# Patient Record
Sex: MALE | Race: WHITE | Employment: UNEMPLOYED | URBAN - METROPOLITAN AREA
[De-identification: names, ages, dates, MRNs, and addresses within clinical notes are randomized per-mention and may not be internally consistent; named-entity substitution may affect disease eponyms.]

---

## 2021-12-14 ENCOUNTER — HOSPITAL ENCOUNTER (OUTPATIENT)
Dept: LAB | Age: 71
Discharge: HOME | End: 2021-12-14
Payer: MEDICARE

## 2021-12-14 ENCOUNTER — TRANSCRIBE ORDER (OUTPATIENT)
Dept: REGISTRATION | Age: 71
End: 2021-12-14

## 2021-12-14 DIAGNOSIS — Z01.812 ENCOUNTER FOR PREOPERATIVE SCREENING LABORATORY TESTING FOR COVID-19 VIRUS: ICD-10-CM

## 2021-12-14 DIAGNOSIS — Z01.812 ENCOUNTER FOR PREOPERATIVE SCREENING LABORATORY TESTING FOR COVID-19 VIRUS: Primary | ICD-10-CM

## 2021-12-14 DIAGNOSIS — Z20.822 ENCOUNTER FOR PREOPERATIVE SCREENING LABORATORY TESTING FOR COVID-19 VIRUS: Primary | ICD-10-CM

## 2021-12-14 DIAGNOSIS — Z20.822 ENCOUNTER FOR PREOPERATIVE SCREENING LABORATORY TESTING FOR COVID-19 VIRUS: ICD-10-CM

## 2021-12-14 PROCEDURE — U0005 INFEC AGEN DETEC AMPLI PROBE: HCPCS

## 2021-12-15 LAB
SARS-COV-2, XPLCVT: NOT DETECTED
SOURCE, COVRS: NORMAL

## 2021-12-17 ENCOUNTER — HOSPITAL ENCOUNTER (OUTPATIENT)
Age: 71
Setting detail: OUTPATIENT SURGERY
Discharge: HOME | End: 2021-12-17
Attending: SPECIALIST | Admitting: SPECIALIST
Payer: MEDICARE

## 2021-12-17 ENCOUNTER — ANESTHESIA EVENT (OUTPATIENT)
Dept: ENDOSCOPY | Age: 71
End: 2021-12-17
Payer: MEDICARE

## 2021-12-17 ENCOUNTER — ANESTHESIA (OUTPATIENT)
Dept: ENDOSCOPY | Age: 71
End: 2021-12-17
Payer: MEDICARE

## 2021-12-17 VITALS
DIASTOLIC BLOOD PRESSURE: 76 MMHG | BODY MASS INDEX: 27.3 KG/M2 | WEIGHT: 190.7 LBS | SYSTOLIC BLOOD PRESSURE: 144 MMHG | OXYGEN SATURATION: 99 % | TEMPERATURE: 97.5 F | HEIGHT: 70 IN | HEART RATE: 73 BPM | RESPIRATION RATE: 16 BRPM

## 2021-12-17 PROCEDURE — 77030013992 HC SNR POLYP ENDOSC BSC -B: Performed by: SPECIALIST

## 2021-12-17 PROCEDURE — 76060000032 HC ANESTHESIA 0.5 TO 1 HR: Performed by: SPECIALIST

## 2021-12-17 PROCEDURE — 88305 TISSUE EXAM BY PATHOLOGIST: CPT

## 2021-12-17 PROCEDURE — 74011000250 HC RX REV CODE- 250: Performed by: NURSE ANESTHETIST, CERTIFIED REGISTERED

## 2021-12-17 PROCEDURE — 2709999900 HC NON-CHARGEABLE SUPPLY: Performed by: SPECIALIST

## 2021-12-17 PROCEDURE — 77030021593 HC FCPS BIOP ENDOSC BSC -A: Performed by: SPECIALIST

## 2021-12-17 PROCEDURE — 76040000007: Performed by: SPECIALIST

## 2021-12-17 PROCEDURE — 74011250636 HC RX REV CODE- 250/636: Performed by: NURSE ANESTHETIST, CERTIFIED REGISTERED

## 2021-12-17 RX ORDER — ACETAMINOPHEN 500 MG
TABLET ORAL DAILY
COMMUNITY

## 2021-12-17 RX ORDER — LIDOCAINE HYDROCHLORIDE 20 MG/ML
INJECTION, SOLUTION EPIDURAL; INFILTRATION; INTRACAUDAL; PERINEURAL AS NEEDED
Status: DISCONTINUED | OUTPATIENT
Start: 2021-12-17 | End: 2021-12-17 | Stop reason: HOSPADM

## 2021-12-17 RX ORDER — FENTANYL CITRATE 50 UG/ML
25 INJECTION, SOLUTION INTRAMUSCULAR; INTRAVENOUS AS NEEDED
Status: DISCONTINUED | OUTPATIENT
Start: 2021-12-17 | End: 2021-12-17 | Stop reason: HOSPADM

## 2021-12-17 RX ORDER — DOXYCYCLINE 100 MG/1
100 TABLET ORAL DAILY
COMMUNITY

## 2021-12-17 RX ORDER — LEVOTHYROXINE SODIUM 100 UG/1
100 TABLET ORAL
COMMUNITY

## 2021-12-17 RX ORDER — NALOXONE HYDROCHLORIDE 0.4 MG/ML
0.4 INJECTION, SOLUTION INTRAMUSCULAR; INTRAVENOUS; SUBCUTANEOUS
Status: DISCONTINUED | OUTPATIENT
Start: 2021-12-17 | End: 2021-12-17 | Stop reason: HOSPADM

## 2021-12-17 RX ORDER — PROPOFOL 10 MG/ML
INJECTION, EMULSION INTRAVENOUS AS NEEDED
Status: DISCONTINUED | OUTPATIENT
Start: 2021-12-17 | End: 2021-12-17 | Stop reason: HOSPADM

## 2021-12-17 RX ORDER — SODIUM CHLORIDE 9 MG/ML
INJECTION, SOLUTION INTRAVENOUS
Status: DISCONTINUED | OUTPATIENT
Start: 2021-12-17 | End: 2021-12-17 | Stop reason: HOSPADM

## 2021-12-17 RX ORDER — FLUMAZENIL 0.1 MG/ML
0.2 INJECTION INTRAVENOUS
Status: DISCONTINUED | OUTPATIENT
Start: 2021-12-17 | End: 2021-12-17 | Stop reason: HOSPADM

## 2021-12-17 RX ORDER — LISINOPRIL 20 MG/1
20 TABLET ORAL DAILY
COMMUNITY

## 2021-12-17 RX ORDER — SODIUM CHLORIDE 9 MG/ML
50 INJECTION, SOLUTION INTRAVENOUS CONTINUOUS
Status: DISCONTINUED | OUTPATIENT
Start: 2021-12-17 | End: 2021-12-17 | Stop reason: HOSPADM

## 2021-12-17 RX ORDER — DEXTROMETHORPHAN/PSEUDOEPHED 2.5-7.5/.8
1.2 DROPS ORAL
Status: DISCONTINUED | OUTPATIENT
Start: 2021-12-17 | End: 2021-12-17 | Stop reason: HOSPADM

## 2021-12-17 RX ORDER — MIDAZOLAM HYDROCHLORIDE 1 MG/ML
.25-5 INJECTION, SOLUTION INTRAMUSCULAR; INTRAVENOUS AS NEEDED
Status: DISCONTINUED | OUTPATIENT
Start: 2021-12-17 | End: 2021-12-17 | Stop reason: HOSPADM

## 2021-12-17 RX ORDER — VENLAFAXINE 75 MG/1
150 TABLET ORAL DAILY
COMMUNITY

## 2021-12-17 RX ORDER — OMEPRAZOLE 20 MG/1
20 CAPSULE, DELAYED RELEASE ORAL DAILY
COMMUNITY

## 2021-12-17 RX ADMIN — PROPOFOL INJECTABLE EMULSION 20 MG: 10 INJECTION, EMULSION INTRAVENOUS at 09:04

## 2021-12-17 RX ADMIN — PROPOFOL INJECTABLE EMULSION 20 MG: 10 INJECTION, EMULSION INTRAVENOUS at 08:48

## 2021-12-17 RX ADMIN — SODIUM CHLORIDE: 9 INJECTION, SOLUTION INTRAVENOUS at 08:33

## 2021-12-17 RX ADMIN — PROPOFOL INJECTABLE EMULSION 100 MG: 10 INJECTION, EMULSION INTRAVENOUS at 08:45

## 2021-12-17 RX ADMIN — PROPOFOL INJECTABLE EMULSION 20 MG: 10 INJECTION, EMULSION INTRAVENOUS at 08:59

## 2021-12-17 RX ADMIN — LIDOCAINE HYDROCHLORIDE 100 MG: 20 INJECTION, SOLUTION INTRAVENOUS at 08:45

## 2021-12-17 RX ADMIN — PROPOFOL INJECTABLE EMULSION 20 MG: 10 INJECTION, EMULSION INTRAVENOUS at 08:51

## 2021-12-17 RX ADMIN — PROPOFOL INJECTABLE EMULSION 20 MG: 10 INJECTION, EMULSION INTRAVENOUS at 09:01

## 2021-12-17 RX ADMIN — PROPOFOL INJECTABLE EMULSION 20 MG: 10 INJECTION, EMULSION INTRAVENOUS at 08:54

## 2021-12-17 RX ADMIN — SODIUM CHLORIDE: 9 INJECTION, SOLUTION INTRAVENOUS at 08:46

## 2021-12-17 RX ADMIN — PROPOFOL INJECTABLE EMULSION 20 MG: 10 INJECTION, EMULSION INTRAVENOUS at 08:57

## 2021-12-17 NOTE — H&P
73 y.o. male for open access colonoscopy for screening   Additional data for completion of the targeted pre-endoscopy H&P will be provided under 'H&P interval notes'. Please see that document which will be attached to this. 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 EGD for surveillance of duodenal neoplasia.   Cely Marie MD

## 2021-12-17 NOTE — ANESTHESIA POSTPROCEDURE EVALUATION
Procedure(s):  ESOPHAGOGASTRODUODENOSCOPY (EGD)  COLONOSCOPY  ENDOSCOPIC POLYPECTOMY. MAC    Anesthesia Post Evaluation      Multimodal analgesia: multimodal analgesia not used between 6 hours prior to anesthesia start to PACU discharge  Patient location during evaluation: PACU  Patient participation: complete - patient participated  Level of consciousness: awake  Pain management: adequate  Airway patency: patent  Anesthetic complications: no  Cardiovascular status: acceptable, blood pressure returned to baseline and hemodynamically stable  Respiratory status: acceptable  Hydration status: acceptable  Post anesthesia nausea and vomiting:  controlled      INITIAL Post-op Vital signs:   Vitals Value Taken Time   /76 12/17/21 0925   Temp 36.4 °C (97.5 °F) 12/17/21 0913   Pulse 72 12/17/21 0926   Resp 12 12/17/21 0926   SpO2 98 % 12/17/21 0926   Vitals shown include unvalidated device data.

## 2021-12-17 NOTE — PROGRESS NOTES
Endoscopy discharge instructions have been reviewed and given to patient. The patient verbalized understanding and acceptance of instructions. Dr. Sunny Vieira discussed with Waldo Ramirez procedure findings and next steps.

## 2021-12-17 NOTE — DISCHARGE INSTRUCTIONS
[address] [facility] LEIA Parry MD  [phone]      December 17, 2021    Tim Jacobs  YOB: 1950    COLONOSCOPY DISCHARGE INSTRUCTIONS    If there is redness at IV site you should apply warm compress to area. If redness or soreness persist contact Dr. Augusto Parry' or your primary care doctor. There may be a slight amount of blood passed from the rectum. Gaseous discomfort may develop, but walking, belching will help relieve this. You may not operate a vehicle for 12 hours  You may not operate machinery or dangerous appliances for rest of today  You may not drink alcoholic beverages for 12 hours  Avoid making any critical decisions for 24 hours    DIET:  You may resume your normal diet, but some patients find that heavy or large meals may lead to indigestion or vomiting. I suggest a light meal as first food intake. MEDICATIONS:  The use of some over-the-counter pain medication may lead to bleeding after colon biopsies or polyp removal.  Tylenol (also called acetaminophen) is safe to take even if you have had colonoscopy with polyp removal.  Based on the procedure you had today you may not safely take aspirin or aspirin-like products for the next ten (10) days. Remember that Tylenol (also called acetaminophen) is safe to take after colonoscopy even if you have had biopsies or polyps removed. ACTIVITY:  You may resume your normal household activities, but it is recommended that you spend the remainder of the day resting -  avoid any strenuous activity. CALL DR. Sreedhar Avalos' OFFICE IF:  Increasing pain, nausea, vomiting  Abdominal distension (swelling)  Significant new or increased bleeding (oral or rectal)  Fever/Chills  Chest pain/shortness of breath                       Additional instructions:   No aspirin 10 days. We found one polyp in the duodenum and one in the colon and we removed them both.   I'll contact you with the biopsy results in about a week. It was an honor to be your doctor today. Please let me or my office staff know if you have any feedback about today's procedure. Petra Dykes MD    Colonoscopy saves lives, and can prevent colon cancer. Everyone aged 48 or older needs colonoscopy.   Tell your family and friends: get the test!

## 2021-12-17 NOTE — PROGRESS NOTES

## 2021-12-17 NOTE — PROGRESS NOTES
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 Everette Yanez  Assistant(s): Endoscopy RN-1: Khalida Rivera RN    Specimens:   ID Type Source Tests Collected by Time Destination   1 : Duodenual Polyp - 3rd portion Preservative Duodenum  Rosy Denson MD 12/17/2021 0848 Pathology   2 : Descending Colon Polyp Preservative Colon, Descending  Rosy Denson MD 12/17/2021 3377 Pathology     H. Pylori  no    Assessment:  Intra-procedure medications     Anesthesia gave intra-procedure sedation and medications, see anesthesia flow sheet yes    Intravenous fluids: NS@ KVO     Vital signs stable yes    Abdominal assessment: round and soft yes    Recommendation:  Discharge patient per MD order yes.   Return to floor na  Family or Friend family  Permission to share finding with family or friend yes

## 2021-12-17 NOTE — ANESTHESIA PREPROCEDURE EVALUATION
Relevant Problems   No relevant active problems       Anesthetic History   No history of anesthetic complications            Review of Systems / Medical History  Patient summary reviewed, nursing notes reviewed and pertinent labs reviewed    Pulmonary        Sleep apnea           Neuro/Psych   Within defined limits           Cardiovascular    Hypertension              Exercise tolerance: >4 METS     GI/Hepatic/Renal     GERD           Endo/Other      Hypothyroidism       Other Findings              Physical Exam    Airway  Mallampati: II    Neck ROM: normal range of motion   Mouth opening: Normal     Cardiovascular  Regular rate and rhythm,  S1 and S2 normal,  no murmur, click, rub, or gallop  Rhythm: regular  Rate: normal         Dental  No notable dental hx       Pulmonary  Breath sounds clear to auscultation               Abdominal  GI exam deferred       Other Findings            Anesthetic Plan    ASA: 2  Anesthesia type: MAC          Induction: Intravenous  Anesthetic plan and risks discussed with: Patient

## 2021-12-17 NOTE — PROCEDURES
[address] [facility] LEIA Martines MD  [phone]      2021    Esophagogastroduodenoscopy & Colonoscopy Procedure Note  Krystin Diaz  : 1950  [facility] Medical Record Number: [id number]      Indications:    History of duodenal adenoma, Family history of coloretal cancer (screening only)  and Screening Colonoscopy   Referring Physician:  Bshsi, Not On File, FNP-C  Anesthesia/Sedation: Conscious Sedation/Moderate Sedation/MAC  Endoscopist:  Dr. Malgorzata Tang  Complications:  None  Estimated Blood Loss:  None    Permit:  The indications, risks, benefits and alternatives were reviewed with the patient or their decision maker who was provided an opportunity to ask questions and all questions were answered. The specific risks of esophagogastroduodenoscopy with conscious sedation were reviewed, including but not limited to anesthetic complication, bleeding, adverse drug reaction, missed lesion, infection, IV site reactions, and intestinal perforation which would lead to the need for surgical repair. Alternatives to EGD and colonoscopy including radiographic imaging, observation without testing, or laboratory testing were reviewed as well as the limitations of those alternatives discussed. After considering the options and having all their questions answered, the patient or their decision maker provided both verbal and written consent to proceed. -----------EGD------------   Procedure in Detail:  After obtaining informed consent, positioning of the patient in the left lateral decubitus position, and conduction of a pre-procedure pause or \"time out\" the endoscope was introduced into the mouth and advanced to the duodenum. A careful inspection was made, and findings or interventions are described below.     Findings:   Esophagus:normal  Stomach: normal   Duodenum/jejunum: 3mm polyp in 3rd portion removed with cold forceps. Hemostasis confirmed. ----------Colonoscopy-----------    Procedure in Detail:  After obtaining informed consent, positioning of the patient in the left lateral decubitus position, and conduction of a pre-procedure pause or \"time out\" the endoscope was introduced into the anus and advanced to the cecum, which was identified by the ileocecal valve and appendiceal orifice. The quality of the colonic preparation was adequate. A careful inspection was made as the colonoscope was withdrawn, findings and interventions are described below. Findings:   7mm polyp descending removed with cold snare, hemostasis confirmed, samples retrieved. In the rectum, medium internal hemorrhoids are noted without bleeding.      ------------------------------  Specimens:    See above    Complications:   None; patient tolerated the procedure well. Impressions:  EGD:  Small duodenal polyp. Colonoscopy: Colon polyp, hemorrhoids. Recommendations:     - Await pathology. Thank you for entrusting me with this patient's care. Please do not hesitate to contact me with any questions or if I can be of assistance with any of your other patients' GI needs. Signed By: Billy Pagan MD                        December 17, 2021    Surgical assistant none. Implants none unless specified.

## 2021-12-17 NOTE — INTERVAL H&P NOTE
Pre-Endoscopy H&P Update  Chief complaint/HPI/ROS:  The indication for the procedure, the patient's history and the patient's current medications are reviewed prior to the procedure and that data is reported on the H&P to which this document is attached. Any significant complaints with regard to organ systems will be noted, and if not mentioned then a review of systems is not contributory. Past Medical History:   Diagnosis Date    Chronic pain     back    GERD (gastroesophageal reflux disease)     Hypertension     Sleep apnea     has inspire    Thyroid disease     hypothyroid      Past Surgical History:   Procedure Laterality Date    HX HEENT Right     eye surgery for lazy eye age 4    HX ORTHOPAEDIC      back surgeries x3 (micro discectomies)    HX VASECTOMY       Social   Social History     Tobacco Use    Smoking status: Former Smoker     Quit date:      Years since quittin.9    Smokeless tobacco: Never Used   Substance Use Topics    Alcohol use: Yes     Alcohol/week: 5.0 standard drinks     Types: 5 Shots of liquor per week      History reviewed. No pertinent family history. Allergies   Allergen Reactions    Zithromax [Azithromycin] Hives      Prior to Admission Medications   Prescriptions Last Dose Informant Patient Reported? Taking? cholecalciferol (VITAMIN D3) (2,000 UNITS /50 MCG) cap capsule 2021 at Unknown time  Yes Yes   Sig: Take  by mouth daily. doxycycline (ADOXA) 100 mg tablet 2021 at Unknown time  Yes Yes   Sig: Take 100 mg by mouth daily. levothyroxine (SYNTHROID) 100 mcg tablet 2021 at Unknown time  Yes Yes   Sig: Take 100 mcg by mouth Daily (before breakfast). lisinopriL (PRINIVIL, ZESTRIL) 20 mg tablet 2021 at Unknown time  Yes Yes   Sig: Take 20 mg by mouth daily. omeprazole (PRILOSEC) 20 mg capsule 2021 at Unknown time  Yes Yes   Sig: Take 20 mg by mouth daily.    venlafaxine (EFFEXOR) 75 mg tablet 2021 at Unknown time  Yes Yes   Sig: Take 150 mg by mouth daily. Facility-Administered Medications: None       PHYSICAL EXAM:  The patient is examined immediately prior to the procedure. Visit Vitals  BP (!) 153/87   Pulse 84   Temp 98 °F (36.7 °C)   Resp 12   Ht 5' 10\" (1.778 m)   Wt 86.5 kg (190 lb 11.2 oz)   SpO2 99%   BMI 27.36 kg/m²     Gen: Appears comfortable, no distress. Pulm: comfortable respirations with no abnormal audible breath sounds  HEART: well perfused, no abnormal audible heart sounds  GI: abdomen flat. PLAN:  Informed consent discussion held, patient afforded an opportunity to ask questions and all questions answered. After being advised of the risks, benefits, and alternatives, the patient requested that we proceed and indicated so on a written consent form. Will proceed with procedure as planned.   Karen Jay MD

## 2023-05-18 RX ORDER — DOXYCYCLINE 100 MG/1
100 TABLET ORAL DAILY
COMMUNITY

## 2023-05-18 RX ORDER — VENLAFAXINE 75 MG/1
150 TABLET ORAL DAILY
COMMUNITY

## 2023-05-18 RX ORDER — LISINOPRIL 20 MG/1
20 TABLET ORAL DAILY
COMMUNITY

## 2023-05-18 RX ORDER — LEVOTHYROXINE SODIUM 0.1 MG/1
100 TABLET ORAL
COMMUNITY

## 2023-05-18 RX ORDER — OMEPRAZOLE 20 MG/1
20 CAPSULE, DELAYED RELEASE ORAL DAILY
COMMUNITY

## 2024-05-19 ENCOUNTER — OFFICE VISIT (OUTPATIENT)
Age: 74
End: 2024-05-19

## 2024-05-19 VITALS
OXYGEN SATURATION: 98 % | HEART RATE: 103 BPM | RESPIRATION RATE: 18 BRPM | TEMPERATURE: 98.4 F | BODY MASS INDEX: 26.88 KG/M2 | DIASTOLIC BLOOD PRESSURE: 64 MMHG | HEIGHT: 70 IN | WEIGHT: 187.8 LBS | SYSTOLIC BLOOD PRESSURE: 112 MMHG

## 2024-05-19 DIAGNOSIS — U07.1 COVID: ICD-10-CM

## 2024-05-19 DIAGNOSIS — R52 BODY ACHES: Primary | ICD-10-CM

## 2024-05-19 LAB
INFLUENZA A ANTIGEN, POC: NORMAL
INFLUENZA B ANTIGEN, POC: NORMAL
Lab: ABNORMAL
PERFORMING INSTRUMENT: ABNORMAL
QC PASS/FAIL: ABNORMAL
SARS-COV-2, POC: DETECTED

## 2024-05-19 RX ORDER — TRAZODONE HYDROCHLORIDE 50 MG/1
50 TABLET ORAL NIGHTLY
COMMUNITY
Start: 2024-03-29

## 2024-05-19 RX ORDER — METOPROLOL SUCCINATE 25 MG/1
25 TABLET, EXTENDED RELEASE ORAL DAILY
COMMUNITY
Start: 2024-03-08

## 2024-05-19 RX ORDER — PANTOPRAZOLE SODIUM 40 MG/1
40 TABLET, DELAYED RELEASE ORAL 2 TIMES DAILY
COMMUNITY
Start: 2024-05-09

## 2024-05-19 RX ORDER — LEVOTHYROXINE SODIUM 0.05 MG/1
50 TABLET ORAL NIGHTLY
COMMUNITY

## 2024-05-19 RX ORDER — AMLODIPINE BESYLATE 10 MG/1
10 TABLET ORAL DAILY
COMMUNITY
Start: 2024-03-01

## (undated) DEVICE — ELECTRODE,RADIOTRANSLUCENT,FOAM,3PK: Brand: MEDLINE

## (undated) DEVICE — CANNULA CUSH AD W/ 14FT TBG

## (undated) DEVICE — SOLIDIFIER MEDC 1200ML -- CONVERT TO 356117

## (undated) DEVICE — KIT COLON W/ 1.1OZ LUB AND 2 END

## (undated) DEVICE — SNARE ENDOSCP M L240CM W27MM SHTH DIA2.4MM CHN 2.8MM OVL

## (undated) DEVICE — SYR 5ML 1/5 GRAD LL NSAF LF --

## (undated) DEVICE — BITEBLOCK ENDOSCP 60FR MAXI WHT POLYETH STURDY W/ VELC WVN

## (undated) DEVICE — CATH IV AUTOGRD BC BLU 22GA 25 -- INSYTE

## (undated) DEVICE — BLUNTFILL: Brand: MONOJECT

## (undated) DEVICE — POLYP TRAP: Brand: TRAPEASE®

## (undated) DEVICE — 1200 GUARD II KIT W/5MM TUBE W/O VAC TUBE: Brand: GUARDIAN

## (undated) DEVICE — BASIN EMSIS 16OZ GRAPHITE PLAS KID SHP MOLD GRAD FOR ORAL

## (undated) DEVICE — BLUNTFILL WITH FILTER: Brand: MONOJECT

## (undated) DEVICE — FORCEPS BX L240CM JAW DIA2.8MM L CAP W/ NDL MIC MESH TOOTH

## (undated) DEVICE — BAG BELONG PT PERS CLEAR HANDL

## (undated) DEVICE — Device

## (undated) DEVICE — SET ADMIN 16ML TBNG L100IN 2 Y INJ SITE IV PIGGY BK DISP

## (undated) DEVICE — CONTAINER SPEC 20 ML LID NEUT BUFF FORMALIN 10 % POLYPR STS

## (undated) DEVICE — SYR 3ML LL TIP 1/10ML GRAD --

## (undated) DEVICE — BAG SPEC BIOHZRD 10 X 10 IN --